# Patient Record
Sex: FEMALE | Race: WHITE | NOT HISPANIC OR LATINO | Employment: OTHER | URBAN - METROPOLITAN AREA
[De-identification: names, ages, dates, MRNs, and addresses within clinical notes are randomized per-mention and may not be internally consistent; named-entity substitution may affect disease eponyms.]

---

## 2018-01-06 ENCOUNTER — HOSPITAL ENCOUNTER (EMERGENCY)
Facility: HOSPITAL | Age: 73
Discharge: HOME | End: 2018-01-06
Attending: EMERGENCY MEDICINE
Payer: MEDICARE

## 2018-01-06 VITALS
DIASTOLIC BLOOD PRESSURE: 76 MMHG | RESPIRATION RATE: 20 BRPM | SYSTOLIC BLOOD PRESSURE: 153 MMHG | TEMPERATURE: 100 F | WEIGHT: 150 LBS | OXYGEN SATURATION: 95 % | BODY MASS INDEX: 23.54 KG/M2 | HEIGHT: 67 IN | HEART RATE: 104 BPM

## 2018-01-06 DIAGNOSIS — J45.901 ASTHMA EXACERBATION WITH COPD (CHRONIC OBSTRUCTIVE PULMONARY DISEASE): Primary | ICD-10-CM

## 2018-01-06 DIAGNOSIS — J44.1 ASTHMA EXACERBATION WITH COPD (CHRONIC OBSTRUCTIVE PULMONARY DISEASE): Primary | ICD-10-CM

## 2018-01-06 LAB
ALBUMIN SERPL BCP-MCNC: 4.4 G/DL
ALP SERPL-CCNC: 61 U/L
ALT SERPL W/O P-5'-P-CCNC: 23 U/L
ANION GAP SERPL CALC-SCNC: 12 MMOL/L
AST SERPL-CCNC: 26 U/L
BACTERIA #/AREA URNS HPF: ABNORMAL /HPF
BASOPHILS # BLD AUTO: 0 K/UL
BASOPHILS NFR BLD: 0.6 %
BILIRUB SERPL-MCNC: 0.5 MG/DL
BILIRUB UR QL STRIP: ABNORMAL
BNP SERPL-MCNC: 30 PG/ML
BUN SERPL-MCNC: 8 MG/DL
CALCIUM SERPL-MCNC: 10.3 MG/DL
CHLORIDE SERPL-SCNC: 99 MMOL/L
CLARITY UR: CLEAR
CO2 SERPL-SCNC: 26 MMOL/L
COLOR UR: YELLOW
CREAT SERPL-MCNC: 1 MG/DL
DIFFERENTIAL METHOD: ABNORMAL
EOSINOPHIL # BLD AUTO: 0.1 K/UL
EOSINOPHIL NFR BLD: 1.7 %
ERYTHROCYTE [DISTWIDTH] IN BLOOD BY AUTOMATED COUNT: 13.7 %
EST. GFR  (AFRICAN AMERICAN): >60 ML/MIN/1.73 M^2
EST. GFR  (NON AFRICAN AMERICAN): 57 ML/MIN/1.73 M^2
FLUAV AG SPEC QL IA: NEGATIVE
FLUBV AG SPEC QL IA: NEGATIVE
GLUCOSE SERPL-MCNC: 127 MG/DL
GLUCOSE UR QL STRIP: NEGATIVE
HCT VFR BLD AUTO: 42 %
HGB BLD-MCNC: 14.3 G/DL
HGB UR QL STRIP: ABNORMAL
HYALINE CASTS #/AREA URNS LPF: 2 /LPF
KETONES UR QL STRIP: ABNORMAL
LACTATE SERPL-SCNC: 0.9 MMOL/L
LEUKOCYTE ESTERASE UR QL STRIP: ABNORMAL
LYMPHOCYTES # BLD AUTO: 1.2 K/UL
LYMPHOCYTES NFR BLD: 16.2 %
MCH RBC QN AUTO: 28.7 PG
MCHC RBC AUTO-ENTMCNC: 34 G/DL
MCV RBC AUTO: 85 FL
MICROSCOPIC COMMENT: ABNORMAL
MONOCYTES # BLD AUTO: 0.4 K/UL
MONOCYTES NFR BLD: 5.4 %
NEUTROPHILS # BLD AUTO: 5.8 K/UL
NEUTROPHILS NFR BLD: 76.1 %
NITRITE UR QL STRIP: NEGATIVE
PH UR STRIP: 6 [PH] (ref 5–8)
PLATELET # BLD AUTO: 260 K/UL
PMV BLD AUTO: 8.5 FL
POTASSIUM SERPL-SCNC: 3.3 MMOL/L
PROT SERPL-MCNC: 8.9 G/DL
PROT UR QL STRIP: ABNORMAL
RBC # BLD AUTO: 4.97 M/UL
RBC #/AREA URNS HPF: 4 /HPF (ref 0–4)
SODIUM SERPL-SCNC: 137 MMOL/L
SP GR UR STRIP: 1.02 (ref 1–1.03)
SPECIMEN SOURCE: NORMAL
SQUAMOUS #/AREA URNS HPF: 6 /HPF
TROPONIN I SERPL DL<=0.01 NG/ML-MCNC: <0.006 NG/ML
URN SPEC COLLECT METH UR: ABNORMAL
UROBILINOGEN UR STRIP-ACNC: NEGATIVE EU/DL
WBC # BLD AUTO: 7.6 K/UL
WBC #/AREA URNS HPF: 3 /HPF (ref 0–5)

## 2018-01-06 PROCEDURE — 83880 ASSAY OF NATRIURETIC PEPTIDE: CPT

## 2018-01-06 PROCEDURE — 93010 ELECTROCARDIOGRAM REPORT: CPT | Mod: ,,, | Performed by: INTERNAL MEDICINE

## 2018-01-06 PROCEDURE — 96374 THER/PROPH/DIAG INJ IV PUSH: CPT

## 2018-01-06 PROCEDURE — 84484 ASSAY OF TROPONIN QUANT: CPT

## 2018-01-06 PROCEDURE — 99284 EMERGENCY DEPT VISIT MOD MDM: CPT | Mod: 25

## 2018-01-06 PROCEDURE — 81000 URINALYSIS NONAUTO W/SCOPE: CPT

## 2018-01-06 PROCEDURE — 93005 ELECTROCARDIOGRAM TRACING: CPT

## 2018-01-06 PROCEDURE — 63600175 PHARM REV CODE 636 W HCPCS: Performed by: EMERGENCY MEDICINE

## 2018-01-06 PROCEDURE — 85025 COMPLETE CBC W/AUTO DIFF WBC: CPT

## 2018-01-06 PROCEDURE — 87400 INFLUENZA A/B EACH AG IA: CPT | Mod: 59

## 2018-01-06 PROCEDURE — 25000242 PHARM REV CODE 250 ALT 637 W/ HCPCS

## 2018-01-06 PROCEDURE — 25000242 PHARM REV CODE 250 ALT 637 W/ HCPCS: Performed by: EMERGENCY MEDICINE

## 2018-01-06 PROCEDURE — 94640 AIRWAY INHALATION TREATMENT: CPT

## 2018-01-06 PROCEDURE — 83605 ASSAY OF LACTIC ACID: CPT

## 2018-01-06 PROCEDURE — 80053 COMPREHEN METABOLIC PANEL: CPT

## 2018-01-06 RX ORDER — CIMETIDINE 400 MG/1
400 TABLET, FILM COATED ORAL 2 TIMES DAILY
COMMUNITY
End: 2020-06-17

## 2018-01-06 RX ORDER — METHYLPREDNISOLONE SOD SUCC 125 MG
125 VIAL (EA) INJECTION
Status: COMPLETED | OUTPATIENT
Start: 2018-01-06 | End: 2018-01-06

## 2018-01-06 RX ORDER — AZITHROMYCIN 250 MG/1
500 TABLET, FILM COATED ORAL ONCE
Qty: 6 TABLET | Refills: 0 | Status: SHIPPED | OUTPATIENT
Start: 2018-01-06 | End: 2018-01-06

## 2018-01-06 RX ORDER — IPRATROPIUM BROMIDE AND ALBUTEROL SULFATE 2.5; .5 MG/3ML; MG/3ML
SOLUTION RESPIRATORY (INHALATION)
Status: COMPLETED
Start: 2018-01-06 | End: 2018-01-06

## 2018-01-06 RX ORDER — MONTELUKAST SODIUM 10 MG/1
10 TABLET ORAL NIGHTLY
COMMUNITY

## 2018-01-06 RX ORDER — PREDNISONE 50 MG/1
50 TABLET ORAL DAILY
Qty: 4 TABLET | Refills: 0 | Status: SHIPPED | OUTPATIENT
Start: 2018-01-06 | End: 2018-01-10

## 2018-01-06 RX ORDER — IPRATROPIUM BROMIDE AND ALBUTEROL SULFATE 2.5; .5 MG/3ML; MG/3ML
3 SOLUTION RESPIRATORY (INHALATION)
Status: COMPLETED | OUTPATIENT
Start: 2018-01-06 | End: 2018-01-06

## 2018-01-06 RX ORDER — METFORMIN HYDROCHLORIDE 500 MG/1
500 TABLET, EXTENDED RELEASE ORAL
COMMUNITY
End: 2023-09-26

## 2018-01-06 RX ADMIN — METHYLPREDNISOLONE SODIUM SUCCINATE 125 MG: 125 INJECTION, POWDER, FOR SOLUTION INTRAMUSCULAR; INTRAVENOUS at 04:01

## 2018-01-06 RX ADMIN — IPRATROPIUM BROMIDE AND ALBUTEROL SULFATE 3 ML: .5; 3 SOLUTION RESPIRATORY (INHALATION) at 05:01

## 2018-01-06 RX ADMIN — IPRATROPIUM BROMIDE AND ALBUTEROL SULFATE 3 ML: .5; 3 SOLUTION RESPIRATORY (INHALATION) at 04:01

## 2018-01-06 NOTE — ED PROVIDER NOTES
Encounter Date: 1/6/2018       History     Chief Complaint   Patient presents with    Shortness of Breath     x 2 days with progressive worsening     Patient 72-year-old female with past history of COPD, diabetes mellitus, pneumonia, angioedema presenting with points of slowly worsening shortness of breath and wheezing over the last few days.  She reports using home nebulizer treatments with some improvement.  She denies associated chest pain at rest, productive cough, back pain, nausea, vomiting, abdominal pain, unilateral or bilateral lower extremity swelling, past history of DVT or PE.  She denies recent courses of steroids.          Review of patient's allergies indicates:  No Known Allergies  Past Medical History:   Diagnosis Date    Anxiety     Asthma     COPD (chronic obstructive pulmonary disease)     Diabetes mellitus     Fatigue     Hyperlipidemia     Idiopathic angioedema     Pneumonia      History reviewed. No pertinent surgical history.  Family History   Problem Relation Age of Onset    Alzheimer's disease Mother     Heart disease Father      Social History   Substance Use Topics    Smoking status: Never Smoker    Smokeless tobacco: Never Used    Alcohol use No     Review of Systems   Constitutional: Negative for fever.   HENT: Positive for congestion.    Eyes: Negative for visual disturbance.   Respiratory: Positive for shortness of breath.    Cardiovascular: Negative for chest pain.   Gastrointestinal: Negative for abdominal pain.   Musculoskeletal: Negative for back pain and joint swelling.   Skin: Negative for color change.   Neurological: Negative for weakness.   Psychiatric/Behavioral: Negative for confusion.       Physical Exam     Initial Vitals [01/06/18 0418]   BP Pulse Resp Temp SpO2   (!) 152/71 (!) 112 18 99.8 °F (37.7 °C) 96 %      MAP       98         Physical Exam    Nursing note and vitals reviewed.  Constitutional: She appears well-nourished. No distress.   HENT:   Head:  Normocephalic and atraumatic.   Eyes: Conjunctivae and EOM are normal.   Neck: Normal range of motion. Neck supple.   Cardiovascular: Normal rate and regular rhythm.   Pulmonary/Chest: No respiratory distress. She has no rhonchi. She has no rales.   Scant generalized wheezing without increased work of breathing   Abdominal: She exhibits no distension. There is no tenderness.   Musculoskeletal: She exhibits no edema or tenderness.   Neurological: She is alert and oriented to person, place, and time.   Skin: Skin is warm and dry.   Psychiatric: She has a normal mood and affect. Thought content normal.         ED Course   Procedures  Labs Reviewed   CBC W/ AUTO DIFFERENTIAL - Abnormal; Notable for the following:        Result Value    MPV 8.5 (*)     Gran% 76.1 (*)     Lymph% 16.2 (*)     All other components within normal limits   COMPREHENSIVE METABOLIC PANEL - Abnormal; Notable for the following:     Potassium 3.3 (*)     Glucose 127 (*)     Total Protein 8.9 (*)     eGFR if non  57 (*)     All other components within normal limits   URINALYSIS - Abnormal; Notable for the following:     Protein, UA Trace (*)     Ketones, UA 1+ (*)     Bilirubin (UA) 1+ (*)     Occult Blood UA 1+ (*)     Leukocytes, UA 1+ (*)     All other components within normal limits   URINALYSIS MICROSCOPIC - Abnormal; Notable for the following:     Hyaline Casts, UA 2 (*)     All other components within normal limits   INFLUENZA A AND B ANTIGEN   B-TYPE NATRIURETIC PEPTIDE   TROPONIN I   LACTIC ACID, PLASMA     EKG Readings: (Independently Interpreted)   Normal sinus rhythm, 100 bpm, normal axis, normal intervals, nonspecific ST and T-wave abnormality, PVC, no acute ischemic wave segments, no STEMI, shaky baseline          Medical Decision Making:   Initial Assessment:   Patient was interviewed and examined emergently.  She is noted to be in no acute distress.  Vital signs are stable with the exception of a borderline febrile  state.  Concern for progressing acute URI as the cause for a mild exacerbation of COPD/asthma.  Lab analyses, including a chest x-ray is negative for severe progressing infection.  She had improvement in the emergency room with IV steroids and breathing treatments.  On reassessment, she reports significant improvement and I do think she is currently stable for discharge.  She is asked to continue her neurologic treatments and will be provided short burst of steroids, as well as azithromycin.  She is asked to follow-up with her primary care doctor soon as possible regarding improvement or to return to the ER for any new, concerning, or worsening symptoms.  Patient agreeable with this plan for follow-up she was discharged in stable condition.                   ED Course      Clinical Impression:   The encounter diagnosis was Asthma exacerbation with COPD (chronic obstructive pulmonary disease).    Disposition:   Disposition: Discharged  Condition: Stable                        Daryl Barakat MD  01/08/18 0126

## 2018-01-06 NOTE — ED NOTES
Pt endorses improvement with breathing.  Frequent coughing noted.  VSWNL. Will continue to monitor closely.

## 2018-01-06 NOTE — ED NOTES
Patient identifiers for Jaylyn Espinal checked and correct.  LOC:  Patient is awake, alert, and aware of environment with an appropriate affect. Patient is oriented x 3 and speaking appropriately.  APPEARANCE:  Patient resting comfortably and in no acute distress. Patient is clean and well groomed, patient's clothing is properly fastened.  SKIN:  The skin is warm and dry. Patient has normal skin turgor and moist mucus membranes. Skin is intact; no bruising or breakdown noted.  MUSCULOSKELETAL:  Patient is moving all extremities well, no obvious deformities noted. Pulses intact.   RESPIRATORY:  Airway is open and patent. Respirations are spontaneous and non-labored with normal effort and rate.  Pt endorses mild SOB upon arrival to ER with RR - 23 BPM. .  Scattered wheezing noted bilaterally.  NAD noted at present.    CARDIAC:  Patient has a normal rate and rhythm. No peripheral edema noted. Capillary refill < 3 seconds.  ABDOMEN:  No distention noted.  Soft and non-tender upon palpation.  NEUROLOGICAL:  PERRL. Facial expression is symmetrical. Hand grasps are equal bilaterally. Normal sensation in all extremities when touched with finger.  Allergies reported:  Review of patient's allergies indicates:  No Known Allergies  OTHER NOTES:

## 2020-06-17 ENCOUNTER — HOSPITAL ENCOUNTER (OUTPATIENT)
Dept: RADIOLOGY | Facility: HOSPITAL | Age: 75
Discharge: HOME | End: 2020-06-17
Attending: NURSE PRACTITIONER
Payer: MEDICARE

## 2020-06-17 DIAGNOSIS — R19.00 INTRA-ABDOMINAL AND PELVIC SWELLING, MASS AND LUMP, UNSPECIFIED SITE: ICD-10-CM

## 2020-06-17 PROCEDURE — 25500020 PHARM REV CODE 255: Performed by: NURSE PRACTITIONER

## 2020-06-17 PROCEDURE — A9698 NON-RAD CONTRAST MATERIALNOC: HCPCS | Performed by: NURSE PRACTITIONER

## 2020-06-17 RX ADMIN — IOHEXOL 75 ML: 350 INJECTION, SOLUTION INTRAVENOUS at 03:06

## 2020-06-17 RX ADMIN — IOHEXOL 1000 ML: 9 SOLUTION ORAL at 02:06

## 2020-06-19 ENCOUNTER — TELEPHONE (OUTPATIENT)
Dept: ADMINISTRATIVE | Facility: OTHER | Age: 75
End: 2020-06-19

## 2020-10-20 ENCOUNTER — OFFICE VISIT (OUTPATIENT)
Dept: PODIATRY | Facility: CLINIC | Age: 75
End: 2020-10-20
Payer: MEDICARE

## 2020-10-20 VITALS
DIASTOLIC BLOOD PRESSURE: 93 MMHG | RESPIRATION RATE: 18 BRPM | OXYGEN SATURATION: 98 % | SYSTOLIC BLOOD PRESSURE: 163 MMHG | HEIGHT: 67 IN | HEART RATE: 70 BPM | BODY MASS INDEX: 25.63 KG/M2 | TEMPERATURE: 97 F | WEIGHT: 163.31 LBS

## 2020-10-20 DIAGNOSIS — M79.675 PAIN OF TOES OF BOTH FEET: ICD-10-CM

## 2020-10-20 DIAGNOSIS — L60.0 INGROWN TOENAIL OF BOTH FEET: ICD-10-CM

## 2020-10-20 DIAGNOSIS — M79.674 PAIN OF TOES OF BOTH FEET: ICD-10-CM

## 2020-10-20 DIAGNOSIS — E11.9 DIABETES MELLITUS WITHOUT COMPLICATION: Primary | ICD-10-CM

## 2020-10-20 DIAGNOSIS — E11.9 COMPREHENSIVE DIABETIC FOOT EXAMINATION, TYPE 2 DM, ENCOUNTER FOR: ICD-10-CM

## 2020-10-20 PROCEDURE — 3077F PR MOST RECENT SYSTOLIC BLOOD PRESSURE >= 140 MM HG: ICD-10-PCS | Mod: CPTII,S$GLB,, | Performed by: PODIATRIST

## 2020-10-20 PROCEDURE — 1101F PR PT FALLS ASSESS DOC 0-1 FALLS W/OUT INJ PAST YR: ICD-10-PCS | Mod: CPTII,S$GLB,, | Performed by: PODIATRIST

## 2020-10-20 PROCEDURE — 1159F PR MEDICATION LIST DOCUMENTED IN MEDICAL RECORD: ICD-10-PCS | Mod: S$GLB,,, | Performed by: PODIATRIST

## 2020-10-20 PROCEDURE — 99999 PR PBB SHADOW E&M-EST. PATIENT-LVL IV: ICD-10-PCS | Mod: PBBFAC,,, | Performed by: PODIATRIST

## 2020-10-20 PROCEDURE — 1126F AMNT PAIN NOTED NONE PRSNT: CPT | Mod: S$GLB,,, | Performed by: PODIATRIST

## 2020-10-20 PROCEDURE — 3080F DIAST BP >= 90 MM HG: CPT | Mod: CPTII,S$GLB,, | Performed by: PODIATRIST

## 2020-10-20 PROCEDURE — 99203 PR OFFICE/OUTPT VISIT, NEW, LEVL III, 30-44 MIN: ICD-10-PCS | Mod: S$GLB,,, | Performed by: PODIATRIST

## 2020-10-20 PROCEDURE — 3008F PR BODY MASS INDEX (BMI) DOCUMENTED: ICD-10-PCS | Mod: CPTII,S$GLB,, | Performed by: PODIATRIST

## 2020-10-20 PROCEDURE — 1101F PT FALLS ASSESS-DOCD LE1/YR: CPT | Mod: CPTII,S$GLB,, | Performed by: PODIATRIST

## 2020-10-20 PROCEDURE — 99999 PR PBB SHADOW E&M-EST. PATIENT-LVL IV: CPT | Mod: PBBFAC,,, | Performed by: PODIATRIST

## 2020-10-20 PROCEDURE — 99203 OFFICE O/P NEW LOW 30 MIN: CPT | Mod: S$GLB,,, | Performed by: PODIATRIST

## 2020-10-20 PROCEDURE — 3077F SYST BP >= 140 MM HG: CPT | Mod: CPTII,S$GLB,, | Performed by: PODIATRIST

## 2020-10-20 PROCEDURE — 3008F BODY MASS INDEX DOCD: CPT | Mod: CPTII,S$GLB,, | Performed by: PODIATRIST

## 2020-10-20 PROCEDURE — 1159F MED LIST DOCD IN RCRD: CPT | Mod: S$GLB,,, | Performed by: PODIATRIST

## 2020-10-20 PROCEDURE — 3080F PR MOST RECENT DIASTOLIC BLOOD PRESSURE >= 90 MM HG: ICD-10-PCS | Mod: CPTII,S$GLB,, | Performed by: PODIATRIST

## 2020-10-20 PROCEDURE — 1126F PR PAIN SEVERITY QUANTIFIED, NO PAIN PRESENT: ICD-10-PCS | Mod: S$GLB,,, | Performed by: PODIATRIST

## 2020-10-20 RX ORDER — TRAZODONE HYDROCHLORIDE 50 MG/1
TABLET ORAL
COMMUNITY
Start: 2020-09-29 | End: 2023-09-26

## 2020-10-20 RX ORDER — A/SINGAPORE/GP1908/2015 IVR-180 (AN A/MICHIGAN/45/2015 (H1N1)PDM09-LIKE VIRUS, A/HONG KONG/4801/2014, NYMC X-263B (H3N2) (AN A/HONG KONG/4801/2014-LIKE VIRUS), AND B/BRISBANE/60/2008, WILD TYPE (A B/BRISBANE/60/2008-LIKE VIRUS) 15; 15; 15 UG/.5ML; UG/.5ML; UG/.5ML
INJECTION, SUSPENSION INTRAMUSCULAR
COMMUNITY
Start: 2020-08-31 | End: 2021-01-28

## 2020-10-23 NOTE — PROGRESS NOTES
Subjective:       Patient ID: Jaylyn Espinal is a 75 y.o. female.    Chief Complaint: Diabetic Foot Exam  Patient presents for diabetic foot exam, complaint of pain in both big toes due to ingrown nails.  Reports this is strongly inherited, she has had symptoms for years, always worse on the right great toe.  She is usually is able to tend to this herself, however it is been increasingly difficult.  She is concerned regarding infection especially with diabetes.  Reports a 5 year history type 2 diabetes.  States it is well controlled on metformin with A1c in the 6.0 range.  She does not check glucose at home.      Past Medical History:   Diagnosis Date    Anxiety     Asthma     COPD (chronic obstructive pulmonary disease)     Diabetes mellitus     Fatigue     Hyperlipidemia     Idiopathic angioedema     Pneumonia     Skin cancer      Past Surgical History:   Procedure Laterality Date    HYSTERECTOMY      SKIN CANCER EXCISION      on the right leg     Family History   Problem Relation Age of Onset    Alzheimer's disease Mother     Heart disease Father     Cancer Brother     Pancreatic cancer Brother     Diabetes Sister      Social History     Socioeconomic History    Marital status: Single     Spouse name: Not on file    Number of children: Not on file    Years of education: Not on file    Highest education level: Not on file   Occupational History    Not on file   Social Needs    Financial resource strain: Not on file    Food insecurity     Worry: Not on file     Inability: Not on file    Transportation needs     Medical: Not on file     Non-medical: Not on file   Tobacco Use    Smoking status: Never Smoker    Smokeless tobacco: Never Used   Substance and Sexual Activity    Alcohol use: No    Drug use: No    Sexual activity: Not Currently   Lifestyle    Physical activity     Days per week: Not on file     Minutes per session: Not on file    Stress: Not on file   Relationships     Social connections     Talks on phone: Not on file     Gets together: Not on file     Attends Mandaeism service: Not on file     Active member of club or organization: Not on file     Attends meetings of clubs or organizations: Not on file     Relationship status: Not on file   Other Topics Concern    Not on file   Social History Narrative    Plans from [employer]        ldl-210.....chol 306.....hdl nl.....tg 216.....tsh nl...fbns 93.....pap nl...neisha nl...bmd t-3.6    c/o- asthma...on ventolin and albuterol...    o/--heent cl...lungs wheezing to mid lung fields    a/ sthma....elv lipid profile    p/refer to dr antony [facility] GYN Exam (Annual/6Wk PP)2012     68 yo .  bse no lumps.....co hot flashes and occ anxiety...asthma on ventolin/allbuterol...neg skinny......h/o bmd    a-menopause sxs.....mild anxiety    p-pap...neisha....bmd.  premarin .4mg/prometrium 100mg qd x10/mo.....metab panel...refill ventolin and albuterol....xanax .25mg #30 prn...rtc 1 wk ch labs....screening colonoscopy dr winter       Current Outpatient Medications   Medication Sig Dispense Refill    albuterol 90 mcg/actuation inhaler Inhale 2 puffs into the lungs every 6 (six) hours as needed for Wheezing. 18 g 1    metFORMIN (GLUCOPHAGE-XR) 500 MG 24 hr tablet Take 500 mg by mouth daily with breakfast.      montelukast (SINGULAIR) 10 mg tablet Take 10 mg by mouth every evening.      pimecrolimus (ELIDEL) 1 % cream       pravastatin (PRAVACHOL) 40 MG tablet       SSD 1 % cream       traZODone (DESYREL) 50 MG tablet       albuterol-ipratropium 2.5mg-0.5mg/3mL (DUONEB) 0.5 mg-3 mg(2.5 mg base)/3 mL nebulizer solution Take 3 mLs by nebulization every 6 (six) hours as needed for Wheezing. 120 vial 0    epinephrine (EPIPEN) 0.3 mg/0.3 mL (1:1,000) AtIn Inject 0.3 mLs (0.3 mg total) into the muscle as needed. 1 Device 1    FLUAD QUAD 2020-21,65Y UP,,PF, 60 mcg (15 mcg x 4)/0.5 mL Syrg       WIXELA INHUB 250-50 mcg/dose diskus inhaler    "     No current facility-administered medications for this visit.      Review of patient's allergies indicates:   Allergen Reactions    Latex, natural rubber Rash       Review of Systems   Constitutional: Negative for fever.   HENT: Negative for congestion.    Respiratory: Negative for cough and shortness of breath.    Cardiovascular: Negative for leg swelling.   Musculoskeletal: Negative for gait problem.   All other systems reviewed and are negative.      Objective:      Vitals:    10/20/20 1448   BP: (!) 163/93   BP Location: Left arm   Patient Position: Sitting   BP Method: Medium (Manual)   Pulse: 70   Resp: 18   Temp: 97.4 °F (36.3 °C)   TempSrc: Temporal   SpO2: 98%   Weight: 74.1 kg (163 lb 4.8 oz)   Height: 5' 7" (1.702 m)     Physical Exam  Vitals signs and nursing note reviewed.   Constitutional:       General: She is not in acute distress.     Appearance: Normal appearance.   Cardiovascular:      Pulses:           Dorsalis pedis pulses are 2+ on the right side and 2+ on the left side.        Posterior tibial pulses are 1+ on the right side and 1+ on the left side.   Pulmonary:      Effort: Pulmonary effort is normal.   Musculoskeletal:         General: No tenderness.      Right foot: Decreased range of motion.      Left foot: Decreased range of motion.   Feet:      Right foot:      Protective Sensation: 6 sites tested. 6 sites sensed.      Skin integrity: Erythema present. No skin breakdown or warmth.      Toenail Condition: Right toenails are ingrown.      Left foot:      Protective Sensation: 6 sites tested. 6 sites sensed.      Skin integrity: Erythema present. No skin breakdown or warmth.      Toenail Condition: Left toenails are ingrown.   Skin:     Capillary Refill: Capillary refill takes 2 to 3 seconds.   Neurological:      General: No focal deficit present.   Psychiatric:         Mood and Affect: Mood normal.         Behavior: Behavior normal.     Vascular         Normal CFT bilateral   No lower " extremity edema bilateral   Pedal skin temperature and color consistent with age bilateral     Integumentary   Ingrown medial and lateral bilateral hallux due to significant curvature nail plates.  Localized erythema, discomfort, minimal edema, no calor or drainage Soft skin texture             No skin breaks, bruises, abrasions bilateral feet        Patient reports cancerous lesion removed anterior right lower leg, bandage is clean, dry and was left intact    Neurological   Gross sensation intact in all areas bilateral feet with monofilament testing, no paresthesias    Musculoskeletal   Muscle Strength/Testing and Tone:  Intact, normal tone bilateral   Joints, Bones, and Muscles:  Arthritic and degenerative changes consistent with age         Walks well unassisted        Presents in appropriate shoes       Assessment:       1. Diabetes mellitus without complication    2. Ingrown toenail of both feet    3. Pain of toes of both feet    4. Comprehensive diabetic foot examination, type 2 DM, encounter for        Plan:           DIABETIC SHOES      Diabetic pedal exam performed, good sensation in feet, neuropathy, circulation     Reviewed diabetic education   Discussed benefit of tight(er) control of glucose/diabetes re  Reviewed appropriate shoes, wide, large toe box, light  especially indoors to protect feet, no flat shoes, slippers or walking in sock or bare feet.   Recommended diabetic shoes to avoid further complications regarding ingrown nails and infection.  Orders dispensed and urged patient to contact her insurance regarding coverage   Discussed maintenance of skin and nails and potential complications.    Reviewed curvature of the nail plate which is inherited, does put her at high risk for ingrown nails.  We discussed signs of infection monitor for in conservative treatments to start immediately should areas become red, painful or swollen.  Reviewed proper trimming of nails and better maintenance to try to  prevent recurrence  Ingrown nail removed/debrided both borders bilateral hallux.  Triple antibiotic ointment, gauze, coban applied. Patient is not a candidate for nail avulsion at this time.  Instructed patient to leave dressing intact until the morning, if sore apply small amount of antibiotic ointment and soft dressing during the day, soak in warm water and Epsom salts in the evening.  Instructed to perform this treatment daily until any remaining redness, swelling or pain have resolved.  Explained any redness or swelling should be completely resolved and pain-free in a few days.  Instructed patient to contact the office with worsening symptoms or not pain-free in 1 week.  Reviewed need for daily foot checks and instructed patient to contact the office with any area of redness or swelling which has not improved within 3 days.  Patient/family were in understanding and agreement with treatment plan.  I counseled the patient on their conditions, implications and medical management.  Instructed patient/family to contact the office with any changes, questions, concerns, worsening of symptoms.   Total face-to-face time, exam, assessment, treatment, discussion, documentation 30 minutes, more than half this time spent on consultation and coordination of care.  Follow up 3 months    This note was created using [employer] voice recognition software that occasionally misinterpreted phrases or words.

## 2020-11-18 DIAGNOSIS — Z12.31 SCREENING MAMMOGRAM, ENCOUNTER FOR: Primary | ICD-10-CM

## 2020-12-15 ENCOUNTER — TELEPHONE (OUTPATIENT)
Dept: PODIATRY | Facility: CLINIC | Age: 75
End: 2020-12-15

## 2020-12-15 NOTE — TELEPHONE ENCOUNTER
----- Message from Liliana Maria sent at 12/15/2020 10:58 AM CST -----  Regarding: Requesting prior auth  Contact: [employer] (nurse & ) w/ [employer] (nurse & ) w/ [employer] # [phone] ext [phone]  Requesting prior auth for diabetic shoes, send auth to [employer] so they can put it on file

## 2020-12-17 ENCOUNTER — TELEPHONE (OUTPATIENT)
Dept: PODIATRY | Facility: CLINIC | Age: 75
End: 2020-12-17

## 2020-12-17 NOTE — TELEPHONE ENCOUNTER
Instructed pt to get information form shoe company on how to send forms to him (email) and then call Hilton from the insurance company provide contact information to her. Hilton can then send appropriate form for company making diabetic shoes to complete. Pt verbalized understanding.

## 2020-12-17 NOTE — TELEPHONE ENCOUNTER
LVM for [employer], we have completed the PA for diabetic shoes but it was sent back due to the fact that we do not make them at our facility. We only Dx pt for this they go to insurance preferred company to have the shoes made. The company making the shoes will need to get PA and provide needed coding for this. Instructed any further questions please do not hesitate to contact our office.

## 2020-12-17 NOTE — TELEPHONE ENCOUNTER
----- Message from Main Boyd sent at 12/17/2020  2:04 PM CST -----  Regarding: PA for diabetic shows to [employer]  Type: Needs Medical Advice    Who Called:  Ptnt  [phone]    Additional Information:     Advised F/U on PA for diabetic shows from [employer]. Please advise.

## 2021-01-07 ENCOUNTER — TELEPHONE (OUTPATIENT)
Dept: PODIATRY | Facility: CLINIC | Age: 76
End: 2021-01-07

## 2021-01-22 ENCOUNTER — PATIENT MESSAGE (OUTPATIENT)
Dept: ADMINISTRATIVE | Facility: OTHER | Age: 76
End: 2021-01-22

## 2021-01-26 ENCOUNTER — OFFICE VISIT (OUTPATIENT)
Dept: PODIATRY | Facility: CLINIC | Age: 76
End: 2021-01-26
Payer: MEDICARE

## 2021-01-26 VITALS
OXYGEN SATURATION: 99 % | HEART RATE: 81 BPM | SYSTOLIC BLOOD PRESSURE: 134 MMHG | HEIGHT: 67 IN | TEMPERATURE: 98 F | DIASTOLIC BLOOD PRESSURE: 93 MMHG | BODY MASS INDEX: 25.58 KG/M2 | WEIGHT: 163 LBS

## 2021-01-26 DIAGNOSIS — M79.674 PAIN OF TOES OF BOTH FEET: ICD-10-CM

## 2021-01-26 DIAGNOSIS — L60.0 INGROWN TOENAIL OF BOTH FEET: ICD-10-CM

## 2021-01-26 DIAGNOSIS — E11.9 DIABETES MELLITUS WITHOUT COMPLICATION: Primary | ICD-10-CM

## 2021-01-26 DIAGNOSIS — M79.675 PAIN OF TOES OF BOTH FEET: ICD-10-CM

## 2021-01-26 PROCEDURE — 1126F PR PAIN SEVERITY QUANTIFIED, NO PAIN PRESENT: ICD-10-PCS | Mod: S$GLB,,, | Performed by: PODIATRIST

## 2021-01-26 PROCEDURE — 99999 PR PBB SHADOW E&M-EST. PATIENT-LVL III: CPT | Mod: PBBFAC,,, | Performed by: PODIATRIST

## 2021-01-26 PROCEDURE — 3288F PR FALLS RISK ASSESSMENT DOCUMENTED: ICD-10-PCS | Mod: CPTII,S$GLB,, | Performed by: PODIATRIST

## 2021-01-26 PROCEDURE — 3075F PR MOST RECENT SYSTOLIC BLOOD PRESS GE 130-139MM HG: ICD-10-PCS | Mod: CPTII,S$GLB,, | Performed by: PODIATRIST

## 2021-01-26 PROCEDURE — 99213 OFFICE O/P EST LOW 20 MIN: CPT | Mod: S$GLB,,, | Performed by: PODIATRIST

## 2021-01-26 PROCEDURE — 3080F PR MOST RECENT DIASTOLIC BLOOD PRESSURE >= 90 MM HG: ICD-10-PCS | Mod: CPTII,S$GLB,, | Performed by: PODIATRIST

## 2021-01-26 PROCEDURE — 3288F FALL RISK ASSESSMENT DOCD: CPT | Mod: CPTII,S$GLB,, | Performed by: PODIATRIST

## 2021-01-26 PROCEDURE — 1101F PR PT FALLS ASSESS DOC 0-1 FALLS W/OUT INJ PAST YR: ICD-10-PCS | Mod: CPTII,S$GLB,, | Performed by: PODIATRIST

## 2021-01-26 PROCEDURE — 3075F SYST BP GE 130 - 139MM HG: CPT | Mod: CPTII,S$GLB,, | Performed by: PODIATRIST

## 2021-01-26 PROCEDURE — 3008F BODY MASS INDEX DOCD: CPT | Mod: CPTII,S$GLB,, | Performed by: PODIATRIST

## 2021-01-26 PROCEDURE — 1126F AMNT PAIN NOTED NONE PRSNT: CPT | Mod: S$GLB,,, | Performed by: PODIATRIST

## 2021-01-26 PROCEDURE — 3008F PR BODY MASS INDEX (BMI) DOCUMENTED: ICD-10-PCS | Mod: CPTII,S$GLB,, | Performed by: PODIATRIST

## 2021-01-26 PROCEDURE — 1159F PR MEDICATION LIST DOCUMENTED IN MEDICAL RECORD: ICD-10-PCS | Mod: S$GLB,,, | Performed by: PODIATRIST

## 2021-01-26 PROCEDURE — 99213 PR OFFICE/OUTPT VISIT, EST, LEVL III, 20-29 MIN: ICD-10-PCS | Mod: S$GLB,,, | Performed by: PODIATRIST

## 2021-01-26 PROCEDURE — 3080F DIAST BP >= 90 MM HG: CPT | Mod: CPTII,S$GLB,, | Performed by: PODIATRIST

## 2021-01-26 PROCEDURE — 1101F PT FALLS ASSESS-DOCD LE1/YR: CPT | Mod: CPTII,S$GLB,, | Performed by: PODIATRIST

## 2021-01-26 PROCEDURE — 99999 PR PBB SHADOW E&M-EST. PATIENT-LVL III: ICD-10-PCS | Mod: PBBFAC,,, | Performed by: PODIATRIST

## 2021-01-26 PROCEDURE — 1159F MED LIST DOCD IN RCRD: CPT | Mod: S$GLB,,, | Performed by: PODIATRIST

## 2021-01-26 RX ORDER — BUDESONIDE AND FORMOTEROL FUMARATE DIHYDRATE 160; 4.5 UG/1; UG/1
AEROSOL RESPIRATORY (INHALATION)
COMMUNITY
Start: 2020-11-27

## 2021-01-26 RX ORDER — ALBUTEROL SULFATE 0.83 MG/ML
SOLUTION RESPIRATORY (INHALATION)
COMMUNITY
Start: 2021-01-07

## 2021-02-09 ENCOUNTER — IMMUNIZATION (OUTPATIENT)
Dept: PRIMARY CARE CLINIC | Facility: CLINIC | Age: 76
End: 2021-02-09
Payer: MEDICARE

## 2021-02-09 DIAGNOSIS — Z23 NEED FOR VACCINATION: Primary | ICD-10-CM

## 2021-02-09 PROCEDURE — 91300 COVID-19, MRNA, LNP-S, PF, 30 MCG/0.3 ML DOSE VACCINE: CPT | Mod: S$GLB,,, | Performed by: FAMILY MEDICINE

## 2021-02-09 PROCEDURE — 91300 COVID-19, MRNA, LNP-S, PF, 30 MCG/0.3 ML DOSE VACCINE: ICD-10-PCS | Mod: S$GLB,,, | Performed by: FAMILY MEDICINE

## 2021-02-09 PROCEDURE — 0001A COVID-19, MRNA, LNP-S, PF, 30 MCG/0.3 ML DOSE VACCINE: ICD-10-PCS | Mod: S$GLB,,, | Performed by: FAMILY MEDICINE

## 2021-02-09 PROCEDURE — 0001A COVID-19, MRNA, LNP-S, PF, 30 MCG/0.3 ML DOSE VACCINE: CPT | Mod: S$GLB,,, | Performed by: FAMILY MEDICINE

## 2021-03-03 ENCOUNTER — IMMUNIZATION (OUTPATIENT)
Dept: PRIMARY CARE CLINIC | Facility: CLINIC | Age: 76
End: 2021-03-03
Payer: MEDICARE

## 2021-03-03 DIAGNOSIS — Z23 NEED FOR VACCINATION: Primary | ICD-10-CM

## 2021-03-03 PROCEDURE — 91300 COVID-19, MRNA, LNP-S, PF, 30 MCG/0.3 ML DOSE VACCINE: ICD-10-PCS | Mod: S$GLB,,, | Performed by: FAMILY MEDICINE

## 2021-03-03 PROCEDURE — 91300 COVID-19, MRNA, LNP-S, PF, 30 MCG/0.3 ML DOSE VACCINE: CPT | Mod: S$GLB,,, | Performed by: FAMILY MEDICINE

## 2021-03-03 PROCEDURE — 0002A COVID-19, MRNA, LNP-S, PF, 30 MCG/0.3 ML DOSE VACCINE: CPT | Mod: CV19,S$GLB,, | Performed by: FAMILY MEDICINE

## 2021-03-03 PROCEDURE — 0002A COVID-19, MRNA, LNP-S, PF, 30 MCG/0.3 ML DOSE VACCINE: ICD-10-PCS | Mod: CV19,S$GLB,, | Performed by: FAMILY MEDICINE

## 2021-08-19 ENCOUNTER — IMMUNIZATION (OUTPATIENT)
Dept: PRIMARY CARE CLINIC | Facility: CLINIC | Age: 76
End: 2021-08-19
Payer: MEDICARE

## 2021-08-19 DIAGNOSIS — Z23 NEED FOR VACCINATION: Primary | ICD-10-CM

## 2021-08-19 PROCEDURE — 0003A COVID-19, MRNA, LNP-S, PF, 30 MCG/0.3 ML DOSE VACCINE: ICD-10-PCS | Mod: CV19,S$GLB,, | Performed by: FAMILY MEDICINE

## 2021-08-19 PROCEDURE — 91300 COVID-19, MRNA, LNP-S, PF, 30 MCG/0.3 ML DOSE VACCINE: CPT | Mod: S$GLB,,, | Performed by: FAMILY MEDICINE

## 2021-08-19 PROCEDURE — 91300 COVID-19, MRNA, LNP-S, PF, 30 MCG/0.3 ML DOSE VACCINE: ICD-10-PCS | Mod: S$GLB,,, | Performed by: FAMILY MEDICINE

## 2021-08-19 PROCEDURE — 0003A COVID-19, MRNA, LNP-S, PF, 30 MCG/0.3 ML DOSE VACCINE: CPT | Mod: CV19,S$GLB,, | Performed by: FAMILY MEDICINE

## 2021-11-17 ENCOUNTER — HOSPITAL ENCOUNTER (OUTPATIENT)
Dept: RADIOLOGY | Facility: HOSPITAL | Age: 76
Discharge: HOME | End: 2021-11-17
Attending: FAMILY MEDICINE
Payer: MEDICARE

## 2021-11-17 VITALS — HEIGHT: 67 IN | WEIGHT: 163 LBS | BODY MASS INDEX: 25.58 KG/M2

## 2021-11-17 DIAGNOSIS — Z12.31 BREAST CANCER SCREENING BY MAMMOGRAM: ICD-10-CM

## 2021-11-17 PROCEDURE — 77067 MAMMO DIGITAL SCREENING BILAT WITH TOMO: ICD-10-PCS | Mod: 26,,, | Performed by: RADIOLOGY

## 2021-11-17 PROCEDURE — 77067 SCR MAMMO BI INCL CAD: CPT | Mod: TC

## 2021-11-17 PROCEDURE — 77063 BREAST TOMOSYNTHESIS BI: CPT | Mod: 26,,, | Performed by: RADIOLOGY

## 2021-11-17 PROCEDURE — 77063 MAMMO DIGITAL SCREENING BILAT WITH TOMO: ICD-10-PCS | Mod: 26,,, | Performed by: RADIOLOGY

## 2021-11-17 PROCEDURE — 77067 SCR MAMMO BI INCL CAD: CPT | Mod: 26,,, | Performed by: RADIOLOGY

## 2021-12-15 ENCOUNTER — TELEPHONE (OUTPATIENT)
Dept: SURGERY | Facility: CLINIC | Age: 76
End: 2021-12-15
Payer: MEDICARE

## 2022-01-20 ENCOUNTER — TELEPHONE (OUTPATIENT)
Dept: SURGERY | Facility: CLINIC | Age: 77
End: 2022-01-20
Payer: MEDICARE

## 2022-01-20 NOTE — TELEPHONE ENCOUNTER
Writer spoke to pt, explained that pt's appointment would have to be moved from Friday, February 4, 2022 at 2:00 as Dr. Brito is not in office at this time.  Pt stated she would call back later to reschedule appointment

## 2022-02-22 ENCOUNTER — HOSPITAL ENCOUNTER (OUTPATIENT)
Dept: RADIOLOGY | Facility: HOSPITAL | Age: 77
Discharge: HOME | End: 2022-02-22
Attending: FAMILY MEDICINE
Payer: MEDICARE

## 2022-02-22 DIAGNOSIS — R06.02 SHORTNESS OF BREATH: Primary | ICD-10-CM

## 2022-02-22 DIAGNOSIS — R06.02 SHORTNESS OF BREATH: ICD-10-CM

## 2022-02-22 PROCEDURE — 71046 XR CHEST PA AND LATERAL: ICD-10-PCS | Mod: 26,,, | Performed by: RADIOLOGY

## 2022-02-22 PROCEDURE — 71046 X-RAY EXAM CHEST 2 VIEWS: CPT | Mod: 26,,, | Performed by: RADIOLOGY

## 2022-02-22 PROCEDURE — 71046 X-RAY EXAM CHEST 2 VIEWS: CPT | Mod: TC,FY

## 2022-02-25 ENCOUNTER — HOSPITAL ENCOUNTER (OUTPATIENT)
Dept: PULMONOLOGY | Facility: HOSPITAL | Age: 77
Discharge: HOME | End: 2022-02-25
Attending: NURSE PRACTITIONER
Payer: MEDICARE

## 2022-02-25 DIAGNOSIS — R06.02 SOB (SHORTNESS OF BREATH): ICD-10-CM

## 2022-02-25 LAB
BRPFT: NORMAL
DLCO ADJ PRE: 20.59 ML/(MIN*MMHG)
DLCO SINGLE BREATH LLN: 16.78
DLCO SINGLE BREATH PRE REF: 91.4 %
DLCO SINGLE BREATH REF: 22.51
DLCOC SBVA LLN: 2.85
DLCOC SBVA PRE REF: 107.9 %
DLCOC SBVA REF: 4.15
DLCOC SINGLE BREATH LLN: 16.78
DLCOC SINGLE BREATH PRE REF: 91.4 %
DLCOC SINGLE BREATH REF: 22.51
DLCOVA LLN: 2.85
DLCOVA PRE REF: 107.9 %
DLCOVA PRE: 4.47 ML/(MIN*MMHG*L)
DLCOVA REF: 4.15
DLVAADJ PRE: 4.47 ML/(MIN*MMHG*L)
ERVN2 LLN: -16449.38
ERVN2 PRE REF: 10.6 %
ERVN2 PRE: 0.07 L
ERVN2 REF: 0.62
FEF 25 75 CHG: -22.3 %
FEF 25 75 LLN: 0.81
FEF 25 75 POST REF: 62 %
FEF 25 75 PRE REF: 79.9 %
FEF 25 75 REF: 1.83
FET100 CHG: -1 %
FEV1 CHG: -9.8 %
FEV1 FVC CHG: -4.3 %
FEV1 FVC LLN: 63
FEV1 FVC POST REF: 95.8 %
FEV1 FVC PRE REF: 100.1 %
FEV1 FVC REF: 77
FEV1 LLN: 1.64
FEV1 POST REF: 65.8 %
FEV1 PRE REF: 73 %
FEV1 REF: 2.29
FRCN2 LLN: 2.06
FRCN2 PRE REF: 60.4 %
FRCN2 REF: 2.88
FVC CHG: -5.7 %
FVC LLN: 2.15
FVC POST REF: 67.9 %
FVC PRE REF: 72.1 %
FVC REF: 3
IVC PRE: 2.55 L
IVC SINGLE BREATH LLN: 2.15
IVC SINGLE BREATH PRE REF: 84.9 %
IVC SINGLE BREATH REF: 3
MVV LLN: 77
MVV PRE REF: 62.9 %
MVV REF: 91
PEF CHG: -15.6 %
PEF LLN: 3.92
PEF POST REF: 79.4 %
PEF PRE REF: 94.1 %
PEF REF: 5.79
POST FEF 25 75: 1.14 L/S
POST FET 100: 3.05 SEC
POST FEV1 FVC: 74.03 %
POST FEV1: 1.51 L
POST FVC: 2.04 L
POST PEF: 4.6 L/S
PRE DLCO: 20.59 ML/(MIN*MMHG)
PRE FEF 25 75: 1.47 L/S
PRE FET 100: 3.08 SEC
PRE FEV1 FVC: 77.33 %
PRE FEV1: 1.67 L
PRE FRC N2: 1.74 L
PRE FVC: 2.16 L
PRE MVV: 57 L/MIN
PRE PEF: 5.45 L/S
RVN2 LLN: 1.69
RVN2 PRE REF: 76.1 %
RVN2 PRE: 1.72 L
RVN2 REF: 2.26
RVN2TLCN2 LLN: 34.53
RVN2TLCN2 PRE REF: 86.4 %
RVN2TLCN2 PRE: 38.14 %
RVN2TLCN2 REF: 44.12
TLCN2 LLN: 4.44
TLCN2 PRE REF: 83.1 %
TLCN2 PRE: 4.51 L
TLCN2 REF: 5.43
VA PRE: 4.6 L
VA SINGLE BREATH LLN: 5.28
VA SINGLE BREATH PRE REF: 87.2 %
VA SINGLE BREATH REF: 5.28
VCMAXN2 LLN: 2.15
VCMAXN2 PRE REF: 93 %
VCMAXN2 PRE: 2.79 L
VCMAXN2 REF: 3

## 2022-02-25 PROCEDURE — 94729 PR C02/MEMBANE DIFFUSE CAPACITY: ICD-10-PCS | Mod: 26,,, | Performed by: INTERNAL MEDICINE

## 2022-02-25 PROCEDURE — 94060 PR EVAL OF BRONCHOSPASM: ICD-10-PCS | Mod: 26,,, | Performed by: INTERNAL MEDICINE

## 2022-02-25 PROCEDURE — 94727 PR PULM FUNCTION TEST BY GAS: ICD-10-PCS | Mod: 26,,, | Performed by: INTERNAL MEDICINE

## 2022-02-25 PROCEDURE — 94727 GAS DIL/WSHOT DETER LNG VOL: CPT | Mod: 26,,, | Performed by: INTERNAL MEDICINE

## 2022-02-25 PROCEDURE — 94729 DIFFUSING CAPACITY: CPT | Mod: 26,,, | Performed by: INTERNAL MEDICINE

## 2022-02-25 PROCEDURE — 94727 GAS DIL/WSHOT DETER LNG VOL: CPT

## 2022-02-25 PROCEDURE — 94060 EVALUATION OF WHEEZING: CPT

## 2022-02-25 PROCEDURE — 94729 DIFFUSING CAPACITY: CPT

## 2022-02-25 PROCEDURE — 94060 EVALUATION OF WHEEZING: CPT | Mod: 26,,, | Performed by: INTERNAL MEDICINE

## 2022-04-07 ENCOUNTER — IMMUNIZATION (OUTPATIENT)
Dept: PRIMARY CARE CLINIC | Facility: CLINIC | Age: 77
End: 2022-04-07
Payer: MEDICARE

## 2022-04-07 DIAGNOSIS — Z23 NEED FOR VACCINATION: Primary | ICD-10-CM

## 2022-04-07 PROCEDURE — 0054A COVID-19, MRNA, LNP-S, PF, 30 MCG/0.3 ML DOSE VACCINE (PFIZER): ICD-10-PCS | Mod: S$GLB,,, | Performed by: FAMILY MEDICINE

## 2022-04-07 PROCEDURE — 91305 COVID-19, MRNA, LNP-S, PF, 30 MCG/0.3 ML DOSE VACCINE (PFIZER): CPT | Mod: S$GLB,,, | Performed by: FAMILY MEDICINE

## 2022-04-07 PROCEDURE — 0054A COVID-19, MRNA, LNP-S, PF, 30 MCG/0.3 ML DOSE VACCINE (PFIZER): CPT | Mod: S$GLB,,, | Performed by: FAMILY MEDICINE

## 2022-04-07 PROCEDURE — 91305 COVID-19, MRNA, LNP-S, PF, 30 MCG/0.3 ML DOSE VACCINE (PFIZER): ICD-10-PCS | Mod: S$GLB,,, | Performed by: FAMILY MEDICINE

## 2023-09-25 ENCOUNTER — OFFICE VISIT (OUTPATIENT)
Dept: PODIATRY | Facility: CLINIC | Age: 78
End: 2023-09-25
Payer: MEDICARE

## 2023-09-25 VITALS
WEIGHT: 165 LBS | SYSTOLIC BLOOD PRESSURE: 142 MMHG | DIASTOLIC BLOOD PRESSURE: 90 MMHG | HEIGHT: 67 IN | BODY MASS INDEX: 25.9 KG/M2 | HEART RATE: 80 BPM

## 2023-09-25 DIAGNOSIS — E11.9 COMPREHENSIVE DIABETIC FOOT EXAMINATION, TYPE 2 DM, ENCOUNTER FOR: ICD-10-CM

## 2023-09-25 DIAGNOSIS — L60.0 INGROWN NAIL: ICD-10-CM

## 2023-09-25 DIAGNOSIS — E11.9 TYPE 2 DIABETES MELLITUS WITHOUT COMPLICATION, WITHOUT LONG-TERM CURRENT USE OF INSULIN: Primary | ICD-10-CM

## 2023-09-25 PROCEDURE — 1100F PR PT FALLS ASSESS DOC 2+ FALLS/FALL W/INJURY/YR: ICD-10-PCS | Mod: CPTII,S$GLB,, | Performed by: PODIATRIST

## 2023-09-25 PROCEDURE — 3080F PR MOST RECENT DIASTOLIC BLOOD PRESSURE >= 90 MM HG: ICD-10-PCS | Mod: CPTII,S$GLB,, | Performed by: PODIATRIST

## 2023-09-25 PROCEDURE — 1159F MED LIST DOCD IN RCRD: CPT | Mod: CPTII,S$GLB,, | Performed by: PODIATRIST

## 2023-09-25 PROCEDURE — 1159F PR MEDICATION LIST DOCUMENTED IN MEDICAL RECORD: ICD-10-PCS | Mod: CPTII,S$GLB,, | Performed by: PODIATRIST

## 2023-09-25 PROCEDURE — 99999 PR PBB SHADOW E&M-EST. PATIENT-LVL IV: ICD-10-PCS | Mod: PBBFAC,,, | Performed by: PODIATRIST

## 2023-09-25 PROCEDURE — 1160F PR REVIEW ALL MEDS BY PRESCRIBER/CLIN PHARMACIST DOCUMENTED: ICD-10-PCS | Mod: CPTII,S$GLB,, | Performed by: PODIATRIST

## 2023-09-25 PROCEDURE — 3288F FALL RISK ASSESSMENT DOCD: CPT | Mod: CPTII,S$GLB,, | Performed by: PODIATRIST

## 2023-09-25 PROCEDURE — 1160F RVW MEDS BY RX/DR IN RCRD: CPT | Mod: CPTII,S$GLB,, | Performed by: PODIATRIST

## 2023-09-25 PROCEDURE — 99999 PR PBB SHADOW E&M-EST. PATIENT-LVL IV: CPT | Mod: PBBFAC,,, | Performed by: PODIATRIST

## 2023-09-25 PROCEDURE — 1100F PTFALLS ASSESS-DOCD GE2>/YR: CPT | Mod: CPTII,S$GLB,, | Performed by: PODIATRIST

## 2023-09-25 PROCEDURE — 3077F SYST BP >= 140 MM HG: CPT | Mod: CPTII,S$GLB,, | Performed by: PODIATRIST

## 2023-09-25 PROCEDURE — 99213 OFFICE O/P EST LOW 20 MIN: CPT | Mod: S$GLB,,, | Performed by: PODIATRIST

## 2023-09-25 PROCEDURE — 3288F PR FALLS RISK ASSESSMENT DOCUMENTED: ICD-10-PCS | Mod: CPTII,S$GLB,, | Performed by: PODIATRIST

## 2023-09-25 PROCEDURE — 1126F AMNT PAIN NOTED NONE PRSNT: CPT | Mod: CPTII,S$GLB,, | Performed by: PODIATRIST

## 2023-09-25 PROCEDURE — 1126F PR PAIN SEVERITY QUANTIFIED, NO PAIN PRESENT: ICD-10-PCS | Mod: CPTII,S$GLB,, | Performed by: PODIATRIST

## 2023-09-25 PROCEDURE — 3077F PR MOST RECENT SYSTOLIC BLOOD PRESSURE >= 140 MM HG: ICD-10-PCS | Mod: CPTII,S$GLB,, | Performed by: PODIATRIST

## 2023-09-25 PROCEDURE — 99213 PR OFFICE/OUTPT VISIT, EST, LEVL III, 20-29 MIN: ICD-10-PCS | Mod: S$GLB,,, | Performed by: PODIATRIST

## 2023-09-25 PROCEDURE — 3080F DIAST BP >= 90 MM HG: CPT | Mod: CPTII,S$GLB,, | Performed by: PODIATRIST

## 2023-09-25 RX ORDER — NYSTATIN 100000 U/G
CREAM TOPICAL
COMMUNITY
Start: 2023-06-22

## 2023-09-25 RX ORDER — ACETAMINOPHEN AND CODEINE PHOSPHATE 300; 30 MG/1; MG/1
1 TABLET ORAL EVERY 6 HOURS PRN
COMMUNITY
Start: 2023-07-15

## 2023-09-25 RX ORDER — TRAZODONE HYDROCHLORIDE 100 MG/1
100 TABLET ORAL NIGHTLY
COMMUNITY
Start: 2023-08-16

## 2023-09-25 RX ORDER — METFORMIN HYDROCHLORIDE 500 MG/1
500 TABLET ORAL
COMMUNITY
Start: 2023-08-16

## 2023-09-25 RX ORDER — ROSUVASTATIN CALCIUM 10 MG/1
10 TABLET, COATED ORAL NIGHTLY
COMMUNITY
Start: 2023-08-16

## 2023-09-25 RX ORDER — MOMETASONE FUROATE 1 MG/G
CREAM TOPICAL
COMMUNITY
Start: 2023-06-22

## 2023-09-25 RX ORDER — IBUPROFEN 600 MG/1
600 TABLET ORAL EVERY 6 HOURS PRN
COMMUNITY
Start: 2023-07-15 | End: 2023-09-26

## 2023-09-26 PROBLEM — E11.9 COMPREHENSIVE DIABETIC FOOT EXAMINATION, TYPE 2 DM, ENCOUNTER FOR: Status: ACTIVE | Noted: 2023-09-26

## 2023-09-26 PROBLEM — E11.9 TYPE 2 DIABETES MELLITUS WITHOUT COMPLICATION, WITHOUT LONG-TERM CURRENT USE OF INSULIN: Status: ACTIVE | Noted: 2023-09-26

## 2023-09-26 PROBLEM — L60.0 INGROWN NAIL: Status: ACTIVE | Noted: 2023-09-26

## 2023-09-26 NOTE — PROGRESS NOTES
Subjective:       Patient ID: Jaylyn Espinal is a 77 y.o. female.    Chief Complaint: No chief complaint on file.  Patient presents for follow up diabetes, ingrown nails.  Reports treatment of these areas significantly decreased her pain, have helped considerably.  Areas are becoming sore, denies drainage.  Has a 7 year history of type 2 diabetes.  Reports always A1c under 7. Metformin, diet, exercise.  Does not check glucose daily.  Denies burning or tingling in feet      Past Medical History:   Diagnosis Date    Anxiety     Asthma     COPD (chronic obstructive pulmonary disease)     Diabetes mellitus     Fatigue     Hyperlipidemia     Idiopathic angioedema     Pneumonia     Skin cancer      Past Surgical History:   Procedure Laterality Date    BREAST BIOPSY Left 1974    HYSTERECTOMY      SKIN CANCER EXCISION      on the right leg     Family History   Problem Relation Age of Onset    Alzheimer's disease Mother     Heart disease Father     Cancer Brother     Pancreatic cancer Brother     Diabetes Sister     Ovarian cancer Neg Hx      Social History     Socioeconomic History    Marital status: Single   Tobacco Use    Smoking status: Never    Smokeless tobacco: Never   Substance and Sexual Activity    Alcohol use: No    Drug use: No    Sexual activity: Not Currently   Social History Narrative    Plans from [facility]        ldl-[id number].....chol 306.....hdl nl.....tg 216.....tsh nl...fbns 93.....pap nl...neisha nl...bmd t-3.6    c/o- asthma...on ventolin and albuterol...    o/--heent cl...lungs wheezing to mid lung fields    a/ sthma....elv lipid profile    p/refer to dr antony [facility]UC[facility] GYN Exam (Annual/6Wk PP)2012     66 yo .  bse no lumps.....co hot flashes and occ anxiety...asthma on ventolin/allbuterol...neg skinny......h/o bmd    a-menopause sxs.....mild anxiety    p-pap...neisha....bmd.  premarin .4mg/prometrium 100mg qd x10/mo.....metab panel...refill ventolin and albuterol....xanax .25mg #30 prn...rtc 1 wk ch  "labs....screening colonoscopy dr winter       Current Outpatient Medications   Medication Sig Dispense Refill    acetaminophen-codeine 300-30mg (TYLENOL #3) 300-30 mg Tab Take 1 tablet by mouth every 6 (six) hours as needed.      albuterol (PROVENTIL) 2.5 mg /3 mL (0.083 %) nebulizer solution       albuterol 90 mcg/actuation inhaler Inhale 2 puffs into the lungs every 6 (six) hours as needed for Wheezing. 18 g 1    metFORMIN (GLUCOPHAGE) 500 MG tablet Take 500 mg by mouth.      mometasone 0.1% (ELOCON) 0.1 % cream Apply topically.      montelukast (SINGULAIR) 10 mg tablet Take 10 mg by mouth every evening.      nystatin (MYCOSTATIN) cream Apply topically.      pravastatin (PRAVACHOL) 40 MG tablet       SSD 1 % cream       SYMBICORT 160-4.5 mcg/actuation HFAA       traZODone (DESYREL) 100 MG tablet Take 100 mg by mouth every evening.      rosuvastatin (CRESTOR) 10 MG tablet Take 10 mg by mouth every evening.       No current facility-administered medications for this visit.     Review of patient's allergies indicates:   Allergen Reactions    Latex, natural rubber Rash       Review of Systems   Constitutional:  Negative for fever.   HENT:  Negative for congestion.    Respiratory:  Negative for cough and shortness of breath.    Cardiovascular:  Negative for leg swelling.   Musculoskeletal:  Negative for gait problem.   All other systems reviewed and are negative.      Objective:      Vitals:    09/25/23 1423   BP: (!) 142/90   BP Location: Left arm   Patient Position: Sitting   Pulse: 80   Weight: 74.8 kg (165 lb)   Height: 5' 7" (1.702 m)     Physical Exam  Vitals and nursing note reviewed.   Constitutional:       General: She is not in acute distress.  Cardiovascular:      Pulses:           Dorsalis pedis pulses are 2+ on the right side and 2+ on the left side.        Posterior tibial pulses are 1+ on the right side and 1+ on the left side.   Pulmonary:      Effort: Pulmonary effort is normal.   Musculoskeletal:    "      General: No tenderness.      Right foot: Decreased range of motion.      Left foot: Decreased range of motion.   Feet:      Right foot:      Protective Sensation: 6 sites tested.  6 sites sensed.      Skin integrity: Erythema present. No skin breakdown or warmth.      Toenail Condition: Right toenails are ingrown.      Left foot:      Protective Sensation: 6 sites tested.  6 sites sensed.      Skin integrity: Erythema present. No skin breakdown or warmth.      Toenail Condition: Left toenails are ingrown.   Skin:     Capillary Refill: Capillary refill takes 2 to 3 seconds.   Neurological:      General: No focal deficit present.   Psychiatric:         Mood and Affect: Mood normal.         Behavior: Behavior normal.     Vascular   No lower extremity edema bilateral   Pedal skin temperature and color consistent with age      Integumentary   Ingrown medial and lateral bilateral hallux due to curvature nail plates.  Localized erythema, discomfort, no edema, no calor or drainage   Soft skin texture             No skin breaks, bruises, abrasions bilateral feet    Neurological   Gross sensation intact bilateral feet    Musculoskeletal   Muscle Strength/Testing and Tone:  Intact, normal tone bilateral   Joints, Bones, and Muscles:  Arthritic and degenerative changes consistent with age         Walks well unassisted        Presents in appropriate shoes                             Assessment:       1. Type 2 diabetes mellitus without complication, without long-term current use of insulin    2. Comprehensive diabetic foot examination, type 2 DM, encounter for    3. Ingrown nail        Plan:           Reviewed diabetic education   Discussed benefit of continued tight control of glucose/diabetes   Reviewed appropriate shoes  Reviewed curvature of the nail plates,  Potential for infection due to ingrown nails, signs of infection to monitor for, conservative treatments and they are maintenance of nail to try to prevent  recurrence  Ingrown nail removed/debrided both borders bilateral hallux.  Triple antibiotic ointment, gauze, coban applied. Patient is not a candidate for nail avulsion at this time.  Instructed patient to leave dressing intact until the morning, if sore apply small amount of antibiotic ointment and soft dressing during the day, soak in warm water and Epsom salts in the evening.  Instructed to perform this treatment daily until any remaining redness, swelling or pain have resolved.  Explained any redness or swelling should be completely resolved and pain-free in a few days.  Instructed patient to contact the office with worsening symptoms or not pain-free in 1 week.  Reviewed need for daily foot checks and instructed patient to contact the office with any area of redness or swelling which has not improved within 3 days.  Patient/family were in understanding and agreement with treatment plan.  I counseled the patient on their conditions, implications and medical management.  Instructed patient/family to contact the office with any changes, questions, concerns, worsening of symptoms.   Total face-to-face time, exam, assessment, treatment, discussion, documentation 20 minutes, more than half this time spent on consultation and coordination of care.  Patient was last seen approximately 2 and half years ago I do recommend more frequent follow-ups every 6-12 months unless she is having any problems questions or concerns sooner.  Patient states she has not been able to get the diabetic shoes previously prescribed.  Comprehensive diabetic evaluation performed.  This note was created using [email or web address] voice recognition software that occasionally misinterpreted phrases or words.

## 2025-03-11 ENCOUNTER — HOSPITAL ENCOUNTER (EMERGENCY)
Facility: HOSPITAL | Age: 80
Discharge: HOME | End: 2025-03-11
Attending: STUDENT IN AN ORGANIZED HEALTH CARE EDUCATION/TRAINING PROGRAM
Payer: MEDICARE

## 2025-03-11 VITALS
OXYGEN SATURATION: 97 % | BODY MASS INDEX: 25.11 KG/M2 | TEMPERATURE: 99 F | DIASTOLIC BLOOD PRESSURE: 88 MMHG | WEIGHT: 160 LBS | HEART RATE: 88 BPM | HEIGHT: 67 IN | SYSTOLIC BLOOD PRESSURE: 163 MMHG | RESPIRATION RATE: 18 BRPM

## 2025-03-11 DIAGNOSIS — J11.1 INFLUENZA: Primary | ICD-10-CM

## 2025-03-11 LAB
ALBUMIN SERPL BCP-MCNC: 4.1 G/DL (ref 3.5–5.2)
ALP SERPL-CCNC: 57 U/L (ref 40–150)
ALT SERPL W/O P-5'-P-CCNC: 23 U/L (ref 10–44)
ANION GAP SERPL CALC-SCNC: 10 MMOL/L (ref 8–16)
AST SERPL-CCNC: 24 U/L (ref 10–40)
BASOPHILS # BLD AUTO: 0.06 K/UL (ref 0–0.2)
BASOPHILS NFR BLD: 1 % (ref 0–1.9)
BILIRUB SERPL-MCNC: 0.4 MG/DL (ref 0.1–1)
BUN SERPL-MCNC: 7 MG/DL (ref 8–23)
CALCIUM SERPL-MCNC: 9.3 MG/DL (ref 8.7–10.5)
CHLORIDE SERPL-SCNC: 103 MMOL/L (ref 95–110)
CO2 SERPL-SCNC: 25 MMOL/L (ref 23–29)
CREAT SERPL-MCNC: 0.8 MG/DL (ref 0.5–1.4)
DIFFERENTIAL METHOD BLD: ABNORMAL
EOSINOPHIL # BLD AUTO: 0.1 K/UL (ref 0–0.5)
EOSINOPHIL NFR BLD: 1 % (ref 0–8)
ERYTHROCYTE [DISTWIDTH] IN BLOOD BY AUTOMATED COUNT: 12.9 % (ref 11.5–14.5)
EST. GFR  (NO RACE VARIABLE): >60 ML/MIN/1.73 M^2
GLUCOSE SERPL-MCNC: 115 MG/DL (ref 70–110)
HCT VFR BLD AUTO: 42.6 % (ref 37–48.5)
HCV AB SERPL QL IA: NORMAL
HGB BLD-MCNC: 14.3 G/DL (ref 12–16)
HIV 1+2 AB+HIV1 P24 AG SERPL QL IA: NORMAL
IMM GRANULOCYTES # BLD AUTO: 0.01 K/UL (ref 0–0.04)
IMM GRANULOCYTES NFR BLD AUTO: 0.2 % (ref 0–0.5)
INFLUENZA A, MOLECULAR: POSITIVE
INFLUENZA B, MOLECULAR: NEGATIVE
LIPASE SERPL-CCNC: 30 U/L (ref 4–60)
LYMPHOCYTES # BLD AUTO: 0.9 K/UL (ref 1–4.8)
LYMPHOCYTES NFR BLD: 13.8 % (ref 18–48)
MCH RBC QN AUTO: 28.7 PG (ref 27–31)
MCHC RBC AUTO-ENTMCNC: 33.6 G/DL (ref 32–36)
MCV RBC AUTO: 86 FL (ref 82–98)
MONOCYTES # BLD AUTO: 0.4 K/UL (ref 0.3–1)
MONOCYTES NFR BLD: 7.1 % (ref 4–15)
NEUTROPHILS # BLD AUTO: 4.8 K/UL (ref 1.8–7.7)
NEUTROPHILS NFR BLD: 76.9 % (ref 38–73)
NRBC BLD-RTO: 0 /100 WBC
PLATELET # BLD AUTO: 215 K/UL (ref 150–450)
PMV BLD AUTO: 9.9 FL (ref 9.2–12.9)
POTASSIUM SERPL-SCNC: 4 MMOL/L (ref 3.5–5.1)
PROT SERPL-MCNC: 7.6 G/DL (ref 6–8.4)
RBC # BLD AUTO: 4.98 M/UL (ref 4–5.4)
SARS-COV-2 RDRP RESP QL NAA+PROBE: NEGATIVE
SODIUM SERPL-SCNC: 138 MMOL/L (ref 136–145)
SPECIMEN SOURCE: ABNORMAL
WBC # BLD AUTO: 6.24 K/UL (ref 3.9–12.7)

## 2025-03-11 PROCEDURE — 63600175 PHARM REV CODE 636 W HCPCS: Mod: UD | Performed by: STUDENT IN AN ORGANIZED HEALTH CARE EDUCATION/TRAINING PROGRAM

## 2025-03-11 PROCEDURE — 83690 ASSAY OF LIPASE: CPT | Performed by: STUDENT IN AN ORGANIZED HEALTH CARE EDUCATION/TRAINING PROGRAM

## 2025-03-11 PROCEDURE — 85025 COMPLETE CBC W/AUTO DIFF WBC: CPT | Performed by: STUDENT IN AN ORGANIZED HEALTH CARE EDUCATION/TRAINING PROGRAM

## 2025-03-11 PROCEDURE — 96374 THER/PROPH/DIAG INJ IV PUSH: CPT

## 2025-03-11 PROCEDURE — 36415 COLL VENOUS BLD VENIPUNCTURE: CPT | Performed by: STUDENT IN AN ORGANIZED HEALTH CARE EDUCATION/TRAINING PROGRAM

## 2025-03-11 PROCEDURE — 87635 SARS-COV-2 COVID-19 AMP PRB: CPT | Performed by: STUDENT IN AN ORGANIZED HEALTH CARE EDUCATION/TRAINING PROGRAM

## 2025-03-11 PROCEDURE — 86803 HEPATITIS C AB TEST: CPT | Performed by: EMERGENCY MEDICINE

## 2025-03-11 PROCEDURE — 87502 INFLUENZA DNA AMP PROBE: CPT | Performed by: STUDENT IN AN ORGANIZED HEALTH CARE EDUCATION/TRAINING PROGRAM

## 2025-03-11 PROCEDURE — 80053 COMPREHEN METABOLIC PANEL: CPT | Performed by: STUDENT IN AN ORGANIZED HEALTH CARE EDUCATION/TRAINING PROGRAM

## 2025-03-11 PROCEDURE — 96361 HYDRATE IV INFUSION ADD-ON: CPT

## 2025-03-11 PROCEDURE — 25000003 PHARM REV CODE 250: Mod: UD | Performed by: STUDENT IN AN ORGANIZED HEALTH CARE EDUCATION/TRAINING PROGRAM

## 2025-03-11 PROCEDURE — 99284 EMERGENCY DEPT VISIT MOD MDM: CPT | Mod: 25

## 2025-03-11 PROCEDURE — 87389 HIV-1 AG W/HIV-1&-2 AB AG IA: CPT | Performed by: EMERGENCY MEDICINE

## 2025-03-11 RX ORDER — ONDANSETRON HYDROCHLORIDE 2 MG/ML
4 INJECTION, SOLUTION INTRAVENOUS
Status: COMPLETED | OUTPATIENT
Start: 2025-03-11 | End: 2025-03-11

## 2025-03-11 RX ORDER — FLUOROURACIL 50 MG/G
1 CREAM TOPICAL 2 TIMES DAILY
COMMUNITY
Start: 2024-10-09

## 2025-03-11 RX ADMIN — ONDANSETRON 4 MG: 2 INJECTION INTRAMUSCULAR; INTRAVENOUS at 10:03

## 2025-03-11 RX ADMIN — SODIUM CHLORIDE 1000 ML: 9 INJECTION, SOLUTION INTRAVENOUS at 10:03

## 2025-03-12 NOTE — ED PROVIDER NOTES
Encounter Date: 3/11/2025       History     Chief Complaint   Patient presents with    Diarrhea     Diarrhea x 1 week. N/v that started last night. Son sick with similar symptoms.     The history is provided by the patient. No  was used.   Diarrhea   This is a new problem. The current episode started several days ago. The problem occurs 5 to 10 times per day. The problem has been gradually worsening. The stool consistency is described as Watery. The patient states that diarrhea does not awaken her from sleep. Associated symptoms include coughing, a fever and vomiting. Pertinent negatives include no abdominal pain or chills. Nothing aggravates the symptoms. Risk factors include ill contacts. She has tried nothing for the symptoms. There is no history of bowel resection, inflammatory bowel disease, irritable bowel syndrome, malabsorption or a recent abdominal surgery.     Review of patient's allergies indicates:   Allergen Reactions    Latex, natural rubber Rash     Past Medical History:   Diagnosis Date    Anxiety     Asthma     COPD (chronic obstructive pulmonary disease)     Diabetes mellitus     Fatigue     Hyperlipidemia     Idiopathic angioedema     Pneumonia     Skin cancer      Past Surgical History:   Procedure Laterality Date    BREAST BIOPSY Left 1974    HYSTERECTOMY      SKIN CANCER EXCISION      on the right leg     Family History   Problem Relation Name Age of Onset    Alzheimer's disease Mother      Heart disease Father      Cancer Brother      Pancreatic cancer Brother      Diabetes Sister      Ovarian cancer Neg Hx       Social History[1]  Review of Systems   Constitutional:  Positive for fever. Negative for chills and fatigue.   HENT:  Negative for congestion and sore throat.    Respiratory:  Positive for cough. Negative for shortness of breath.    Cardiovascular:  Negative for chest pain.   Gastrointestinal:  Positive for diarrhea, nausea and vomiting. Negative for abdominal  pain.   Genitourinary:  Negative for dysuria.   Musculoskeletal:  Negative for back pain.   Skin:  Negative for rash.   Neurological:  Negative for dizziness, weakness and numbness.   Hematological:  Does not bruise/bleed easily.       Physical Exam     Initial Vitals [03/11/25 1017]   BP Pulse Resp Temp SpO2   (!) 176/93 93 20 98.9 °F (37.2 °C) 95 %      MAP       --         Physical Exam    Constitutional: She appears well-developed and well-nourished. She is not diaphoretic. No distress.   HENT:   Head: Normocephalic and atraumatic.   Right Ear: External ear normal.   Left Ear: External ear normal.   Eyes: EOM are normal. Pupils are equal, round, and reactive to light. No scleral icterus.   Neck: Neck supple.   Normal range of motion.  Cardiovascular:  Normal rate, regular rhythm and normal heart sounds.           Pulmonary/Chest: Breath sounds normal. No stridor. No respiratory distress. She has no wheezes. She has no rales.   Abdominal: Abdomen is soft. She exhibits no distension. There is no abdominal tenderness.   Musculoskeletal:         General: No tenderness or edema. Normal range of motion.      Cervical back: Normal range of motion and neck supple.     Neurological: She is alert and oriented to person, place, and time. She has normal strength. No cranial nerve deficit. GCS score is 15. GCS eye subscore is 4. GCS verbal subscore is 5. GCS motor subscore is 6.   Skin: Skin is warm and dry. Capillary refill takes less than 2 seconds. No pallor.   Psychiatric: She has a normal mood and affect.         ED Course   Procedures  Labs Reviewed   INFLUENZA A & B BY MOLECULAR - Abnormal       Result Value    Influenza A, Molecular Positive (*)     Influenza B, Molecular Negative      Flu A & B Source Nasal Swab     CBC W/ AUTO DIFFERENTIAL - Abnormal    WBC 6.24      RBC 4.98      Hemoglobin 14.3      Hematocrit 42.6      MCV 86      MCH 28.7      MCHC 33.6      RDW 12.9      Platelets 215      MPV 9.9       Immature Granulocytes 0.2      Gran # (ANC) 4.8      Immature Grans (Abs) 0.01      Lymph # 0.9 (*)     Mono # 0.4      Eos # 0.1      Baso # 0.06      nRBC 0      Gran % 76.9 (*)     Lymph % 13.8 (*)     Mono % 7.1      Eosinophil % 1.0      Basophil % 1.0      Differential Method Automated     COMPREHENSIVE METABOLIC PANEL - Abnormal    Sodium 138      Potassium 4.0      Chloride 103      CO2 25      Glucose 115 (*)     BUN 7 (*)     Creatinine 0.8      Calcium 9.3      Total Protein 7.6      Albumin 4.1      Total Bilirubin 0.4      Alkaline Phosphatase 57      AST 24      ALT 23      eGFR >60.0      Anion Gap 10     HEPATITIS C ANTIBODY    Hepatitis C Ab Non-reactive      Narrative:     Release to patient->Immediate   HIV 1 / 2 ANTIBODY    HIV 1/2 Ag/Ab Non-reactive      Narrative:     Release to patient->Immediate   LIPASE    Lipase 30     SARS-COV-2 RNA AMPLIFICATION, QUAL    SARS-CoV-2 RNA, Amplification, Qual Negative            Imaging Results    None          Medications   sodium chloride 0.9% bolus 1,000 mL 1,000 mL (0 mLs Intravenous Stopped 3/11/25 1204)   ondansetron injection 4 mg (4 mg Intravenous Given 3/11/25 1055)     Medical Decision Making  79-year-old female presenting with 5 days of diarrhea and 24 hours of nausea and vomiting.  States her son has been sick with similar symptoms.  Denies any abdominal pain.  On exam she has stable vitals.  Abdomen is benign.  Overall well-appearing.  Given Zofran and fluids.  Labs were overall reassuring and she had no significant abnormalities.  She was flu positive.  Feel this is likely the cause of her symptoms.  Abdomen remained benign.  Low suspicion for superimposed intra-abdominal infection, pneumonia, or serious bacterial illness at this time.  She is given return precautions and discharged home.  Outside the window for Tamiflu.  Instructed to follow up with primary care.    Amount and/or Complexity of Data Reviewed  Labs:  ordered.    Risk  Prescription drug management.                                      Clinical Impression:  Final diagnoses:  [J11.1] Influenza (Primary)          ED Disposition Condition    Discharge Stable          ED Prescriptions    None       Follow-up Information       Follow up With Specialties Details Why Contact Info    Franklyn Leonard MD Family Medicine Schedule an appointment as soon as possible for a visit   [address] [place] [id number]  [phone]                 [1]   Social History  Tobacco Use    Smoking status: Never    Smokeless tobacco: Never   Substance Use Topics    Alcohol use: No    Drug use: No        Lux Lema MD  03/12/25 0907

## 2025-07-21 ENCOUNTER — OFFICE VISIT (OUTPATIENT)
Dept: FAMILY MEDICINE | Facility: CLINIC | Age: 80
End: 2025-07-21
Payer: MEDICARE

## 2025-07-21 VITALS
HEIGHT: 67 IN | OXYGEN SATURATION: 96 % | DIASTOLIC BLOOD PRESSURE: 84 MMHG | WEIGHT: 165.81 LBS | BODY MASS INDEX: 26.02 KG/M2 | HEART RATE: 88 BPM | SYSTOLIC BLOOD PRESSURE: 136 MMHG

## 2025-07-21 DIAGNOSIS — F51.01 PRIMARY INSOMNIA: ICD-10-CM

## 2025-07-21 DIAGNOSIS — E78.2 MIXED HYPERLIPIDEMIA: Primary | ICD-10-CM

## 2025-07-21 DIAGNOSIS — E11.9 TYPE 2 DIABETES MELLITUS WITHOUT COMPLICATION, WITHOUT LONG-TERM CURRENT USE OF INSULIN: ICD-10-CM

## 2025-07-21 DIAGNOSIS — M81.0 AGE-RELATED OSTEOPOROSIS WITHOUT CURRENT PATHOLOGICAL FRACTURE: ICD-10-CM

## 2025-07-21 DIAGNOSIS — J44.9 CHRONIC OBSTRUCTIVE PULMONARY DISEASE, UNSPECIFIED COPD TYPE: ICD-10-CM

## 2025-07-21 PROBLEM — L60.0 INGROWN NAIL: Status: RESOLVED | Noted: 2023-09-26 | Resolved: 2025-07-21

## 2025-07-21 PROCEDURE — 3075F SYST BP GE 130 - 139MM HG: CPT | Mod: CPTII,S$GLB,, | Performed by: FAMILY MEDICINE

## 2025-07-21 PROCEDURE — 3079F DIAST BP 80-89 MM HG: CPT | Mod: CPTII,S$GLB,, | Performed by: FAMILY MEDICINE

## 2025-07-21 PROCEDURE — 3288F FALL RISK ASSESSMENT DOCD: CPT | Mod: CPTII,S$GLB,, | Performed by: FAMILY MEDICINE

## 2025-07-21 PROCEDURE — 99204 OFFICE O/P NEW MOD 45 MIN: CPT | Mod: S$GLB,,, | Performed by: FAMILY MEDICINE

## 2025-07-21 PROCEDURE — 99999 PR PBB SHADOW E&M-EST. PATIENT-LVL III: CPT | Mod: PBBFAC,,, | Performed by: FAMILY MEDICINE

## 2025-07-21 PROCEDURE — 1126F AMNT PAIN NOTED NONE PRSNT: CPT | Mod: CPTII,S$GLB,, | Performed by: FAMILY MEDICINE

## 2025-07-21 PROCEDURE — 1159F MED LIST DOCD IN RCRD: CPT | Mod: CPTII,S$GLB,, | Performed by: FAMILY MEDICINE

## 2025-07-21 PROCEDURE — G2211 COMPLEX E/M VISIT ADD ON: HCPCS | Mod: S$GLB,,, | Performed by: FAMILY MEDICINE

## 2025-07-21 PROCEDURE — 1101F PT FALLS ASSESS-DOCD LE1/YR: CPT | Mod: CPTII,S$GLB,, | Performed by: FAMILY MEDICINE

## 2025-07-21 RX ORDER — EZETIMIBE 10 MG/1
10 TABLET ORAL
COMMUNITY
Start: 2025-07-17

## 2025-07-21 NOTE — ASSESSMENT & PLAN NOTE
-fasting lipid profile ordered  Continue Pravastatin 40 mg q.h.s.  -low-cholesterol diet advised  -LDL goal less than 70 mg/dL    Orders:    Lipid Panel; Future

## 2025-07-21 NOTE — PROGRESS NOTES
"[facility]      Subjective         Chief Complaint   Patient presents with    [facility]      Pleasant 79-year-old  female presents to establish/PCP.  Previous PCP Dr. Leonard.  Patient has known history of hyperlipidemia, type 2 dm, HLD, COPD/asthma.  Provided medical histories are reviewed.  Takes medications as prescribed.  No specific health concerns today.  Lives alone, 1 son Kush Hernandez, 1 son Sachi.  Independent with ADLs.  Continues to drive.  Due for DEXA, reports she has osteoporosis, but does not completed DEXA screening in many years.  Does not take supplemental calcium.  PHQ 2:0.    Past Medical History:   Diagnosis Date    Anxiety     Asthma     COPD (chronic obstructive pulmonary disease)     Diabetes mellitus     Fatigue     Hyperlipidemia     Idiopathic angioedema     Pneumonia     Right lower lobe pneumonia 09/07/2015    Skin cancer         Past Surgical History:   Procedure Laterality Date    BREAST BIOPSY Left 1974    HYSTERECTOMY      MOUTH SURGERY      SKIN CANCER EXCISION      on the right leg        Review of patient's allergies indicates:   Allergen Reactions    Latex, natural rubber Rash        Review of Systems   Constitutional: Negative.    HENT: Negative.     Eyes: Negative.    Respiratory: Negative.     Cardiovascular: Negative.    Gastrointestinal: Negative.    Genitourinary: Negative.    Musculoskeletal: Negative.    Skin: Negative.    Neurological: Negative.    Endo/Heme/Allergies: Negative.    Psychiatric/Behavioral: Negative.                 Objective      Vitals:    07/21/25 1544   BP: 136/84   BP Location: Left arm   Patient Position: Sitting   Pulse: 88   SpO2: 96%   Weight: 75.2 kg (165 lb 12.8 oz)   Height: 5' 7" (1.702 m)        Physical Exam  Vitals reviewed.   Constitutional:       Appearance: Normal appearance.   HENT:      Head: Normocephalic and atraumatic.      Right Ear: Tympanic membrane and ear canal normal.      Left " "Ear: Tympanic membrane and ear canal normal.      Mouth/Throat:      Mouth: Mucous membranes are moist.      Pharynx: Oropharynx is clear.   Eyes:      Extraocular Movements: Extraocular movements intact.      Conjunctiva/sclera: Conjunctivae normal.   Cardiovascular:      Rate and Rhythm: Normal rate and regular rhythm.      Pulses: Normal pulses.   Pulmonary:      Effort: Pulmonary effort is normal.      Breath sounds: Normal breath sounds.   Abdominal:      General: Abdomen is flat.      Palpations: Abdomen is soft.   Musculoskeletal:         General: Normal range of motion.      Cervical back: Neck supple.   Lymphadenopathy:      Cervical: No cervical adenopathy.   Skin:     General: Skin is warm and dry.      Comments: Surgical scar anterior right lower extremity well healed   Neurological:      General: No focal deficit present.      Mental Status: She is alert.   Psychiatric:         Mood and Affect: Mood normal.         No results found for: "TSH"     Lab Results   Component Value Date    HGBA1C 6.5 (H) 09/08/2015      Lab Results   Component Value Date    HGBA1C 6.5 (H) 09/08/2015     CMP  Sodium   Date Value Ref Range Status   03/11/2025 138 136 - 145 mmol/L Final   06/30/2020 140 136 - 147 mmol/L Final   06/24/2020 141 136 - 147 mmol/L Final   01/06/2018 137 136 - 145 mmol/L Final   09/14/2015 142 136 - 145 mmol/L Final     Potassium   Date Value Ref Range Status   03/11/2025 4.0 3.5 - 5.1 mmol/L Final   06/30/2020 4.4 3.5 - 5.1 mmol/L Final   06/24/2020 4.1 3.5 - 5.1 mmol/L Final   01/06/2018 3.3 (L) 3.5 - 5.1 mmol/L Final   09/14/2015 3.8 3.5 - 5.1 mmol/L Final     Chloride   Date Value Ref Range Status   03/11/2025 103 95 - 110 mmol/L Final   06/30/2020 104 98 - 107 mmol/L Final   06/24/2020 103 98 - 107 mmol/L Final   01/06/2018 99 95 - 110 mmol/L Final   09/14/2015 101 95 - 110 mmol/L Final     CO2   Date Value Ref Range Status   03/11/2025 25 23 - 29 mmol/L Final   06/30/2020 27 20 - 31 mmol/L Final "   06/24/2020 32 (H) 20 - 31 mmol/L Final   01/06/2018 26 23 - 29 mmol/L Final   09/14/2015 29 23 - 29 mmol/L Final     Glucose   Date Value Ref Range Status   03/11/2025 115 (H) 70 - 110 mg/dL Final   01/06/2018 127 (H) 70 - 110 mg/dL Final   09/14/2015 113 (H) 70 - 110 mg/dL Final     BUN   Date Value Ref Range Status   03/11/2025 7 (L) 8 - 23 mg/dL Final   06/30/2020 10 9 - 23 mg/dL Final   06/24/2020 12 9 - 23 mg/dL Final   06/17/2020 13 8 - 23 mg/dL Final   01/06/2018 8 8 - 23 mg/dL Final     Creatinine   Date Value Ref Range Status   03/11/2025 0.8 0.5 - 1.4 mg/dL Final   06/30/2020 0.69 0.55 - 1.02 mg/dL Final   06/24/2020 0.80 0.55 - 1.02 mg/dL Final   06/17/2020 0.8 0.5 - 1.4 mg/dL Final   01/06/2018 1.0 0.5 - 1.4 mg/dL Final     Calcium   Date Value Ref Range Status   03/11/2025 9.3 8.7 - 10.5 mg/dL Final   06/30/2020 8.1 (L) 8.3 - 10.6 mg/dL Final   06/24/2020 9.9 8.3 - 10.6 mg/dL Final   01/06/2018 10.3 8.7 - 10.5 mg/dL Final   09/14/2015 8.8 8.7 - 10.5 mg/dL Final     Total Protein   Date Value Ref Range Status   03/11/2025 7.6 6.0 - 8.4 g/dL Final   01/06/2018 8.9 (H) 6.0 - 8.4 g/dL Final   09/14/2015 5.7 (L) 6.0 - 8.4 g/dL Final     Albumin   Date Value Ref Range Status   03/11/2025 4.1 3.5 - 5.2 g/dL Final   01/06/2018 4.4 3.5 - 5.2 g/dL Final   09/14/2015 2.9 (L) 3.5 - 5.2 g/dL Final     Total Bilirubin   Date Value Ref Range Status   03/11/2025 0.4 0.1 - 1.0 mg/dL Final     Comment:     For infants and newborns, interpretation of results should be based  on gestational age, weight and in agreement with clinical  observations.    Premature Infant recommended reference ranges:  Up to 24 hours.............<8.0 mg/dL  Up to 48 hours............<12.0 mg/dL  3-5 days..................<15.0 mg/dL  6-29 days.................<15.0 mg/dL     01/06/2018 0.5 0.1 - 1.0 mg/dL Final     Comment:     For infants and newborns, interpretation of results should be based  on gestational age, weight and in agreement  with clinical  observations.  Premature Infant recommended reference ranges:  Up to 24 hours.............<8.0 mg/dL  Up to 48 hours............<12.0 mg/dL  3-5 days..................<15.0 mg/dL  6-29 days.................<15.0 mg/dL     09/14/2015 0.4 0.1 - 1.0 mg/dL Final     Comment:     For infants and newborns, interpretation of results should be based  on gestational age, weight and in agreement with clinical  observations.  Premature Infant recommended reference ranges:  Up to 24 hours.............<8.0 mg/dL  Up to 48 hours............<12.0 mg/dL  3-5 days..................<15.0 mg/dL  6-29 days.................<15.0 mg/dL       Alkaline Phosphatase   Date Value Ref Range Status   03/11/2025 57 40 - 150 U/L Final   01/06/2018 61 55 - 135 U/L Final   09/14/2015 47 (L) 55 - 135 U/L Final     AST   Date Value Ref Range Status   03/11/2025 24 10 - 40 U/L Final   01/06/2018 26 10 - 40 U/L Final   09/14/2015 19 10 - 40 U/L Final     ALT   Date Value Ref Range Status   03/11/2025 23 10 - 44 U/L Final   01/06/2018 23 10 - 44 U/L Final   09/14/2015 41 10 - 44 U/L Final     Anion Gap   Date Value Ref Range Status   03/11/2025 10 8 - 16 mmol/L Final   01/06/2018 12 8 - 16 mmol/L Final   09/14/2015 12 8 - 16 mmol/L Final     eGFR if    Date Value Ref Range Status   06/17/2020 >60.0 >60 mL/min/1.73 m^2 Final   01/06/2018 >60 >60 mL/min/1.73 m^2 Final   09/14/2015 >60 >60 mL/min/1.73 m^2 Final     eGFR    Date Value Ref Range Status   06/30/2020 >90 >90 mL/min/1.73m2 Final     Comment:     CKD-EPI GFR INTERPRETATION GUIDELINES  Estimated using CKD-EPI equation based on standardized serum creatinine, age, and sex    DESCRIPTION               eGFR mL/min/1.73m2  Mild Decrease                           60 - 89  Moderate Decrease                       30 - 59  Severe Decrease                         15 - 29  Kidney Failure                          <15 (or dialysis)   06/24/2020 85 (L) >90  "mL/min/1.73m2 Final     Comment:     CKD-EPI GFR INTERPRETATION GUIDELINES  Estimated using CKD-EPI equation based on standardized serum creatinine, age, and sex    DESCRIPTION               eGFR mL/min/1.73m2  Mild Decrease                           60 - 89  Moderate Decrease                       30 - 59  Severe Decrease                         15 - 29  Kidney Failure                          <15 (or dialysis)     eGFR if non    Date Value Ref Range Status   06/17/2020 >60.0 >60 mL/min/1.73 m^2 Final     Comment:     Calculation used to obtain the estimated glomerular filtration  rate (eGFR) is the CKD-EPI equation.      01/06/2018 57 (A) >60 mL/min/1.73 m^2 Final     Comment:     Calculation used to obtain the estimated glomerular filtration  rate (eGFR) is the CKD-EPI equation.      09/14/2015 >60 >60 mL/min/1.73 m^2 Final     Comment:     Calculation used to obtain the estimated glomerular filtration  rate (eGFR) is the CKD-EPI equation. Since race is unknown   in our information system, the eGFR values for   -American and Non--American patients are given   for each creatinine result.       eGFR   Date Value Ref Range Status   06/30/2020 87 (L) >90 mL/min/1.73m2 Final   06/24/2020 74 (L) >90 mL/min/1.73m2 Final    No results found for: "CHOL"  No results found for: "HDL"  No results found for: "LDLCALC"  No results found for: "TRIG"  No results found for: "CHOLHDL"       Medication List with Changes/Refills   Current Medications    ACETAMINOPHEN-CODEINE 300-30MG (TYLENOL #3) 300-30 MG TAB    Take 1 tablet by mouth every 6 (six) hours as needed.    ALBUTEROL (PROVENTIL) 2.5 MG /3 ML (0.083 %) NEBULIZER SOLUTION        ALBUTEROL 90 MCG/ACTUATION INHALER    Inhale 2 puffs into the lungs every 6 (six) hours as needed for Wheezing.    EZETIMIBE (ZETIA) 10 MG TABLET    Take 10 mg by mouth.    FLUOROURACIL (EFUDEX) 5 % CREAM    Apply 1 application  topically 2 (two) times daily.    " METFORMIN (GLUCOPHAGE) 500 MG TABLET    Take 500 mg by mouth.    MOMETASONE 0.1% (ELOCON) 0.1 % CREAM    Apply topically.    MONTELUKAST (SINGULAIR) 10 MG TABLET    Take 10 mg by mouth every evening.    NYSTATIN (MYCOSTATIN) CREAM    Apply topically.    PRAVASTATIN (PRAVACHOL) 40 MG TABLET        SSD 1 % CREAM        SYMBICORT 160-4.5 MCG/ACTUATION HFAA        TRAZODONE (DESYREL) 100 MG TABLET    Take 100 mg by mouth every evening.   Discontinued Medications    ROSUVASTATIN (CRESTOR) 10 MG TABLET    Take 10 mg by mouth every evening.           Assessment & Plan     Assessment & Plan  Mixed hyperlipidemia  -fasting lipid profile ordered  Continue Pravastatin 40 mg q.h.s.  -low-cholesterol diet advised  -LDL goal less than 70 mg/dL    Orders:    Lipid Panel; Future    Age-related osteoporosis without current pathological fracture  -takes no supplemental calcium/vitamin-D  -due DEXA scan  -DEXA scan ordered  -encouraged weight-bearing exercise  Orders:    DXA Bone Density Axial Skeleton 1 or more sites; Future    Chronic obstructive pulmonary disease, unspecified COPD type  -continue Symbicort  -continue albuterol nebs PRN  -recommend PFT and future with optimization of breathing and exacerbation risk factor identification         Type 2 diabetes mellitus without complication, without long-term current use of insulin  -continue Metformin  -we will need urine microalbumin assessment  -annual dilated eye exam  -recommend annual foot exam  -monitor blood sugars at home  -A1c next visit         Primary insomnia  -current trazodone 100 mg q.h.s., patient wanting to titrate down  -recommend decrease dose to Trazodone 50 mg q.h.s. times 10-14 days then discontinue          Plan of care discussed with the patient she understands, all questions answered she elects to proceed.     Christopher L Jones, MD [facility]  [address] [place] [address],[place] [id number] [phone]

## 2025-07-21 NOTE — ASSESSMENT & PLAN NOTE
-continue Symbicort  -continue albuterol nebs PRN  -recommend PFT and future with optimization of breathing and exacerbation risk factor identification

## 2025-07-21 NOTE — ASSESSMENT & PLAN NOTE
-current trazodone 100 mg q.h.s., patient wanting to titrate down  -recommend decrease dose to Trazodone 50 mg q.h.s. times 10-14 days then discontinue

## 2025-07-21 NOTE — ASSESSMENT & PLAN NOTE
-continue Metformin  -we will need urine microalbumin assessment  -annual dilated eye exam  -recommend annual foot exam  -monitor blood sugars at home  -A1c next visit

## 2025-07-21 NOTE — ASSESSMENT & PLAN NOTE
-takes no supplemental calcium/vitamin-D  -due DEXA scan  -DEXA scan ordered  -encouraged weight-bearing exercise  Orders:    DXA Bone Density Axial Skeleton 1 or more sites; Future

## 2025-07-31 ENCOUNTER — HOSPITAL ENCOUNTER (OUTPATIENT)
Dept: RADIOLOGY | Facility: HOSPITAL | Age: 80
Discharge: HOME | End: 2025-07-31
Attending: FAMILY MEDICINE
Payer: MEDICARE

## 2025-07-31 DIAGNOSIS — M81.0 AGE-RELATED OSTEOPOROSIS WITHOUT CURRENT PATHOLOGICAL FRACTURE: ICD-10-CM

## 2025-07-31 PROCEDURE — 77091 TBS TECHL CALCULATION ONLY: CPT

## 2025-07-31 PROCEDURE — 77080 DXA BONE DENSITY AXIAL: CPT | Mod: 26,,, | Performed by: RADIOLOGY

## 2025-07-31 PROCEDURE — 77092 TBS I&R FX RSK QHP: CPT | Mod: ,,, | Performed by: RADIOLOGY
